# Patient Record
Sex: FEMALE | Race: WHITE
[De-identification: names, ages, dates, MRNs, and addresses within clinical notes are randomized per-mention and may not be internally consistent; named-entity substitution may affect disease eponyms.]

---

## 2018-05-15 ENCOUNTER — HOSPITAL ENCOUNTER (EMERGENCY)
Dept: HOSPITAL 80 - FED | Age: 23
LOS: 1 days | Discharge: HOME | End: 2018-05-16
Payer: COMMERCIAL

## 2018-05-15 DIAGNOSIS — E86.9: ICD-10-CM

## 2018-05-15 DIAGNOSIS — R10.2: Primary | ICD-10-CM

## 2018-05-15 LAB — PLATELET # BLD: 244 10^3/UL (ref 150–400)

## 2018-05-15 NOTE — EDPHY
H & P


Stated Complaint: LLQ abd pain for 3 days, hasn't had a period in 6weeks


Time Seen by Provider: 05/15/18 22:56


HPI/ROS: 





HPI





CHIEF COMPLAINT:  Left-sided abdominal pain.





HISTORY OF PRESENT ILLNESS:  This patient is a 22-year-old female she is 

otherwise healthy with no significant medical history she presents emergency 

room left-sided abdominal pain.  Specifically she complains of left adnexal 

pain.  She states been present for 3 days.  She has not had her period in 6 

weeks.  She is unsure if she is pregnant however she does report that she took 

multiple pregnancy tests at home and states that they were all negative.  She 

does have intercourse with 1 partner.  Monogamous relationship.  They do not 

use contraception.  Denies any urinary symptoms.  Does complain of some back 

pain some lightheadedness.  Denies chest pain or shortness of breath.  Decided 

come the emergency room due to ongoing left lower quadrant abdominal pain.





Past Medical History:  Iron deficiency anemia


Past Surgical History:  Adenoidectomy tonsillectomy





Social History:  Denies daily use of tobacco or illegal drugs.  Occasional 

alcohol use.





Family History:  Noncontributory








ROS   


REVIEW OF SYSTEMS:


A comprehensive 10 point review of systems is otherwise negative aside from 

elements mentioned in the history of present illness.








Exam   


Constitutional   appears well nontoxic triage nursing summary reviewed, vital 

signs reviewed, awake/alert. 


Eyes   normal conjunctivae and sclera, EOMI, PERRLA. 


HENT   normal inspection, atraumatic, moist mucus membranes, no epistaxis, neck 

supple/ no meningismus, no raccoon eyes. 


Respiratory   clear to auscultation bilaterally, normal breath sounds, no 

respiratory distress, no wheezing. 


Cardiovascular   rate normal, regular rhythm, no murmur, no edema, distal 

pulses normal. 


Gastrointestinal really can't elicit any significant tenderness on exam,  soft, 

non-tender, no rebound, no guarding, normal bowel sounds, no distension, no 

pulsatile mass. 


Genitourinary   no CVA tenderness. 


Musculoskeletal  no midline vertebral tenderness, full range of motion, no calf 

swelling, no tenderness of extremities, no meningismus, good pulses, 

neurovascularly intact.


Skin   pink, warm, & dry, no rash, skin atraumatic. 


Neurologic   awake, alert and oriented x 3, AAOx3, moves all 4 extremities 

equally, motor intact, sensory intact, CN II-XII intact, normal cerebellar, 

normal vision, normal speech. 


Psychiatric   normal mood/affect. 


Heme/Lymph/Immune   no lymphadenopathy.





Differential diagnosis includes but is not limited to and in no particular order

:  Ectopic pregnancy, pregnancy, ovarian cyst, ruptured ovarian cyst, ovarian 

torsion.  Bowel obstruction, appendicitis, gallbladder disease, diverticulitis, 

colitis, enteritis, perforated viscus, gastritis, GERD, esophagitis, urinary 

tract infection, pyelonephritis, kidney stones





Medical Decision Making:  Plan for this patient IV establishment blood draw, 

check pregnancy test, check UA, gentle IV fluids, basic blood work including 

CBC and chemistry, ultrasound rule out torsion.  Rule out ectopic pregnancy.





Re-evaluation:








1204AM:  Pelvic ultrasound unremarkable for acute disease process.  No 

significant ovarian cyst.  No free fluid.





Patient's blood work reviewed.  Ultrasound reviewed and urinalysis reviewed.





I did go re-evaluate her abdomen is soft nontender.  She is not vomiting.  

Blood work is appropriate.  Urinalysis clean.  Ultrasound shows no evidence of 

significant inflammatory process.





I offered to do a pelvic exam given that she is having some low left adnexal 

pain which is improved.  However she declined pelvic exam here in emergency 

room.





She does understand return emergency room she develops worsening abdominal pain

, pelvic pain, fever, vaginal discharge or vomiting.  She denies any vaginal 

discharge or significant pain at this time.





I did recommend pelvic exam for completeness sake however she is decline.  

Return precautions discussed with her she understands.


Source: Patient





- Personal History


LMP (Females 10-55): Over 28 Days Ago


Current Tetanus/Diphtheria Vaccine: Yes


Current Tetanus Diphtheria and Acellular Pertussis (TDAP): Yes





- Medical/Surgical History


Hx Asthma: No


Hx Chronic Respiratory Disease: No


Hx Diabetes: No


Hx Cardiac Disease: No


Hx Renal Disease: No


Hx Cirrhosis: No


Hx Alcoholism: No


Hx HIV/AIDS: No


Hx Splenectomy or Spleen Trauma: No


Other PMH: tonsilectomy and adnoids





- Social History


Smoking Status: Never smoked


Constitutional: 


 Initial Vital Signs











Temperature (C)  36.9 C   05/15/18 22:38


 


Heart Rate  80   05/15/18 22:38


 


Respiratory Rate  16   05/15/18 22:38


 


Blood Pressure  143/95 H  05/15/18 22:38


 


O2 Sat (%)  97   05/15/18 22:38








 











O2 Delivery Mode               Room Air














Allergies/Adverse Reactions: 


 





No Known Allergies Allergy (Unverified 05/15/18 22:42)


 








Home Medications: 














 Medication  Instructions  Recorded


 


NK [No Known Home Meds]  05/15/18














Medical Decision Making





- Diagnostics


Imaging Results: 


 Imaging Impressions





Pelvic/Renal Ultrasound  05/15/18 23:00


Impression: Normal pelvic ultrasound for age. 


 


Findings discussed with Kevin Perry MD  at  0:03 hour, 5/16/2018.














- Data Points


Laboratory Results: 


 Laboratory Results





 05/15/18 23:05 





 05/15/18 23:05 





 











  05/16/18 05/15/18 05/15/18





  00:00 23:05 23:05


 


WBC      





    


 


RBC      





    


 


Hgb      





    


 


Hct      





    


 


MCV      





    


 


MCH      





    


 


MCHC      





    


 


RDW      





    


 


Plt Count      





    


 


MPV      





    


 


Neut % (Auto)      





    


 


Lymph % (Auto)      





    


 


Mono % (Auto)      





    


 


Eos % (Auto)      





    


 


Baso % (Auto)      





    


 


Nucleat RBC Rel Count      





    


 


Absolute Neuts (auto)      





    


 


Absolute Lymphs (auto)      





    


 


Absolute Monos (auto)      





    


 


Absolute Eos (auto)      





    


 


Absolute Basos (auto)      





    


 


Absolute Nucleated RBC      





    


 


Immature Gran %      





    


 


Immature Gran #      





    


 


Sodium      138 mEq/L mEq/L





     (135-145) 


 


Potassium      4.3 mEq/L mEq/L





     (3.3-5.0) 


 


Chloride      102 mEq/L mEq/L





     () 


 


Carbon Dioxide      22 mEq/l mEq/l





     (22-31) 


 


Anion Gap      14 mEq/L mEq/L





     (8-16) 


 


BUN      13 mg/dL mg/dL





     (7-23) 


 


Creatinine      0.7 mg/dL mg/dL





     (0.6-1.0) 


 


Estimated GFR      > 60 





    


 


Glucose      89 mg/dL mg/dL





     () 


 


Calcium      9.5 mg/dL mg/dL





     (8.5-10.4) 


 


Total Bilirubin      0.5 mg/dL mg/dL





     (0.1-1.4) 


 


Conjugated Bilirubin      0.4 mg/dL mg/dL





     (0.0-0.5) 


 


Unconjugated Bilirubin      0.1 mg/dL mg/dL





     (0.0-1.1) 


 


AST      25 IU/L IU/L





     (14-46) 


 


ALT      39 IU/L IU/L





     (9-52) 


 


Alkaline Phosphatase      61 IU/L IU/L





     () 


 


Total Protein      7.6 g/dL g/dL





     (6.3-8.2) 


 


Albumin      4.5 g/dL g/dL





     (3.5-5.0) 


 


Lipase      123 IU/L IU/L





     () 


 


Beta HCG, Qual    NEGATIVE   





    


 


Urine Color  YELLOW     





    


 


Urine Appearance  CLEAR     





    


 


Urine pH  6.0     





   (5.0-7.5)   


 


Ur Specific Gravity  1.011     





   (1.002-1.030)   


 


Urine Protein  NEGATIVE     





   (NEGATIVE)   


 


Urine Ketones  NEGATIVE     





   (NEGATIVE)   


 


Urine Blood  NEGATIVE     





   (NEGATIVE)   


 


Urine Nitrate  NEGATIVE     





   (NEGATIVE)   


 


Urine Bilirubin  NEGATIVE     





   (NEGATIVE)   


 


Urine Urobilinogen  NEGATIVE EU EU    





   (0.2-1.0)   


 


Ur Leukocyte Esterase  NEGATIVE     





   (NEGATIVE)   


 


Urine Glucose  NEGATIVE     





   (NEGATIVE)   














  05/15/18





  23:05


 


WBC  8.99 10^3/uL 10^3/uL





   (3.80-9.50) 


 


RBC  4.50 10^6/uL 10^6/uL





   (4.18-5.33) 


 


Hgb  14.0 g/dL g/dL





   (12.6-16.3) 


 


Hct  40.2 % %





   (38.0-47.0) 


 


MCV  89.3 fL fL





   (81.5-99.8) 


 


MCH  31.1 pg pg





   (27.9-34.1) 


 


MCHC  34.8 g/dL g/dL





   (32.4-36.7) 


 


RDW  12.2 % %





   (11.5-15.2) 


 


Plt Count  244 10^3/uL 10^3/uL





   (150-400) 


 


MPV  9.5 fL fL





   (8.7-11.7) 


 


Neut % (Auto)  48.5 % %





   (39.3-74.2) 


 


Lymph % (Auto)  43.2 % %





   (15.0-45.0) 


 


Mono % (Auto)  6.9 % %





   (4.5-13.0) 


 


Eos % (Auto)  0.9 % %





   (0.6-7.6) 


 


Baso % (Auto)  0.4 % %





   (0.3-1.7) 


 


Nucleat RBC Rel Count  0.0 % %





   (0.0-0.2) 


 


Absolute Neuts (auto)  4.36 10^3/uL 10^3/uL





   (1.70-6.50) 


 


Absolute Lymphs (auto)  3.88 10^3/uL H 10^3/uL





   (1.00-3.00) 


 


Absolute Monos (auto)  0.62 10^3/uL 10^3/uL





   (0.30-0.80) 


 


Absolute Eos (auto)  0.08 10^3/uL 10^3/uL





   (0.03-0.40) 


 


Absolute Basos (auto)  0.04 10^3/uL 10^3/uL





   (0.02-0.10) 


 


Absolute Nucleated RBC  0.00 10^3/uL 10^3/uL





   (0-0.01) 


 


Immature Gran %  0.1 % %





   (0.0-1.1) 


 


Immature Gran #  0.01 10^3/uL 10^3/uL





   (0.00-0.10) 


 


Sodium  





  


 


Potassium  





  


 


Chloride  





  


 


Carbon Dioxide  





  


 


Anion Gap  





  


 


BUN  





  


 


Creatinine  





  


 


Estimated GFR  





  


 


Glucose  





  


 


Calcium  





  


 


Total Bilirubin  





  


 


Conjugated Bilirubin  





  


 


Unconjugated Bilirubin  





  


 


AST  





  


 


ALT  





  


 


Alkaline Phosphatase  





  


 


Total Protein  





  


 


Albumin  





  


 


Lipase  





  


 


Beta HCG, Qual  





  


 


Urine Color  





  


 


Urine Appearance  





  


 


Urine pH  





  


 


Ur Specific Gravity  





  


 


Urine Protein  





  


 


Urine Ketones  





  


 


Urine Blood  





  


 


Urine Nitrate  





  


 


Urine Bilirubin  





  


 


Urine Urobilinogen  





  


 


Ur Leukocyte Esterase  





  


 


Urine Glucose  





  











Medications Given: 


 








Discontinued Medications





Sodium Chloride (Ns)  1,000 mls @ 0 mls/hr IV EDNOW ONE; Wide Open


   PRN Reason: Protocol


   Stop: 05/15/18 22:57


   Last Admin: 05/15/18 23:12 Dose:  1,000 mls








Departure





- Departure


Disposition: Home, Routine, Self-Care


Clinical Impression: 


 Acute pelvic pain, female





Condition: Good


Instructions:  Pelvic Pain in Women (ED), Pelvic Pain (ED)


Additional Instructions: 


1. Return to the emergency room if you have worsening abdominal pain or pelvic 

pain.





Referrals: 


NONE *PRIMARY CARE P,. [Primary Care Provider] - As per Instructions

## 2018-05-16 VITALS — DIASTOLIC BLOOD PRESSURE: 74 MMHG | SYSTOLIC BLOOD PRESSURE: 115 MMHG
